# Patient Record
Sex: FEMALE | Race: BLACK OR AFRICAN AMERICAN | NOT HISPANIC OR LATINO | URBAN - METROPOLITAN AREA
[De-identification: names, ages, dates, MRNs, and addresses within clinical notes are randomized per-mention and may not be internally consistent; named-entity substitution may affect disease eponyms.]

---

## 2020-01-29 ENCOUNTER — EMERGENCY (EMERGENCY)
Facility: HOSPITAL | Age: 55
LOS: 1 days | Discharge: HOME | End: 2020-01-29
Admitting: EMERGENCY MEDICINE
Payer: COMMERCIAL

## 2020-01-29 VITALS
HEIGHT: 66 IN | OXYGEN SATURATION: 100 % | RESPIRATION RATE: 16 BRPM | SYSTOLIC BLOOD PRESSURE: 154 MMHG | TEMPERATURE: 98 F | WEIGHT: 240.08 LBS | DIASTOLIC BLOOD PRESSURE: 100 MMHG | HEART RATE: 63 BPM

## 2020-01-29 PROCEDURE — 99284 EMERGENCY DEPT VISIT MOD MDM: CPT

## 2020-01-29 PROCEDURE — 99283 EMERGENCY DEPT VISIT LOW MDM: CPT

## 2020-01-29 RX ORDER — OXYCODONE AND ACETAMINOPHEN 5; 325 MG/1; MG/1
1 TABLET ORAL ONCE
Refills: 0 | Status: DISCONTINUED | OUTPATIENT
Start: 2020-01-29 | End: 2020-01-29

## 2020-01-29 RX ORDER — LIDOCAINE 4 G/100G
1 CREAM TOPICAL ONCE
Refills: 0 | Status: COMPLETED | OUTPATIENT
Start: 2020-01-29 | End: 2020-01-29

## 2020-01-29 RX ADMIN — OXYCODONE AND ACETAMINOPHEN 1 TABLET(S): 5; 325 TABLET ORAL at 07:35

## 2020-01-29 RX ADMIN — LIDOCAINE 1 PATCH: 4 CREAM TOPICAL at 07:21

## 2020-01-29 RX ADMIN — OXYCODONE AND ACETAMINOPHEN 1 TABLET(S): 5; 325 TABLET ORAL at 07:20

## 2020-01-29 NOTE — ED PROVIDER NOTE - OBJECTIVE STATEMENT
S/w pt and advised Dr. Vaughn gave referral to [facility] and gave number to schedule appt. [phone].  Gave the names of the medications that the Gastroenterologist at [place] recommended also.      Pt states understanding.  States she is going to restart IvIg per rheumatology if she can afford the medication.    Neeta GIPSON RN  EP Triage     54 F pmh gastric bypass, chronic low back pain/sciatica p/w acute on chronic low back pain exacerbated in setting of sitting in chair in ED while waiting with her daughter who is waiting for psych admission.  Pt reports prior to coming to ED she took her percocet which is prescribed by pain management doctor at [facility] then took motrin around 4am but forgot rest of her meds at home.  States sitting in chair exacerbated her chronic L lower back pain w/ radiation down LLE- reports chronic tingling in LLE, no new numbness/weakness.  Pt ambulates with cane.  Denies fever, numbness, weakness, bowel/bladder dysfunction, dysuria, hematuria, saddle paresthesia, h/o CA, h/o IVDA, fall/trauma

## 2020-01-29 NOTE — ED ADULT NURSE NOTE - CHPI ED NUR SYMPTOMS NEG
no bladder dysfunction/no constipation/no anorexia/no motor function loss/no bowel dysfunction/no neck tenderness

## 2020-01-29 NOTE — ED ADULT TRIAGE NOTE - CHIEF COMPLAINT QUOTE
low back pain since 3 am (H/O sciatica- took Percocet 9pm and Motrin 4am) pain now worst - radiates to left leg

## 2020-01-29 NOTE — ED PROVIDER NOTE - PHYSICAL EXAMINATION
Vitals reviewed  Gen: appears in mild discomfort sitting on edge of stretcher but nad, speaking in full sentences  Skin: wwp   HEENT: ncat, eomi, mmm  Back: L lumbar and L gluteal ttp, no midline ttp, +L SLR  CV: rrr  Resp: unlabored breathing  Abd: obese, soft/nt  Ext: FROM throughout, no peripheral edema, distal sensation intact, 5/5 strength in all ext  Neuro: alert/oriented, no focal deficits, steady gait with cane

## 2020-01-29 NOTE — ED PROVIDER NOTE - CLINICAL SUMMARY MEDICAL DECISION MAKING FREE TEXT BOX
54 F pmh gastric bypass, chronic low back pain/sciatica p/w acute on chronic low back pain exacerbated in setting of sitting in chair in ED while waiting for family member in ED.  Given percocet/lidocaine patch which pt takes as outpt prescribed pain management regimen but left at home.  discussed strict return parameters

## 2020-01-29 NOTE — ED PROVIDER NOTE - NSFOLLOWUPINSTRUCTIONS_ED_ALL_ED_FT
Continue your prescribed pain management regimen and follow up with pain management doctor within one week.  Return to ED for fever, worsening pain, numbness, weakness, dizziness, falls, difficulty walking, bowel or bladder dysfunction    Sciatica     Sciatica is pain, numbness, weakness, or tingling along your sciatic nerve. The sciatic nerve starts in the lower back and goes down the back of each leg. Sciatica happens when this nerve is pinched or has pressure put on it. Sciatica usually goes away on its own or with treatment. Sometimes, sciatica may keep coming back (recur).  Follow these instructions at home:  Medicines     Take over-the-counter and prescription medicines only as told by your doctor.Do not drive or use heavy machinery while taking prescription pain medicine.Managing pain     If directed, put ice on the affected area.  Put ice in a plastic bag.Place a towel between your skin and the bag.Leave the ice on for 20 minutes, 2–3 times a day.After icing, apply heat to the affected area before you exercise or as often as told by your doctor. Use the heat source that your doctor tells you to use, such as a moist heat pack or a heating pad.  Place a towel between your skin and the heat source.Leave the heat on for 20–30 minutes.Remove the heat if your skin turns bright red. This is especially important if you are unable to feel pain, heat, or cold. You may have a greater risk of getting burned.Activity     Return to your normal activities as told by your doctor. Ask your doctor what activities are safe for you.  Avoid activities that make your sciatica worse.Take short rests during the day. Rest in a lying or standing position. This is usually better than sitting to rest.  When you rest for a long time, do some physical activity or stretching between periods of rest.Avoid sitting for a long time without moving. Get up and move around at least one time each hour.Exercise and stretch regularly, as told by your doctor.Do not lift anything that is heavier than 10 lb (4.5 kg) while you have symptoms of sciatica.  Avoid lifting heavy things even when you do not have symptoms.Avoid lifting heavy things over and over.When you lift objects, always lift in a way that is safe for your body. To do this, you should:  Bend your knees.Keep the object close to your body.Avoid twisting.General instructions     Use good posture.  Avoid leaning forward when you are sitting.Avoid hunching over when you are standing.Stay at a healthy weight.Wear comfortable shoes that support your feet. Avoid wearing high heels.Avoid sleeping on a mattress that is too soft or too hard. You might have less pain if you sleep on a mattress that is firm enough to support your back.Keep all follow-up visits as told by your doctor. This is important.Contact a doctor if:  You have pain that:  Wakes you up when you are sleeping.Gets worse when you lie down.Is worse than the pain you have had in the past.Lasts longer than 4 weeks.You lose weight for without trying.Get help right away if:  You cannot control when you pee (urinate) or poop (have a bowel movement).You have weakness in any of these areas and it gets worse.  Lower back.Lower belly (pelvis).Butt (buttocks).Legs.You have redness or swelling of your back.You have a burning feeling when you pee.This information is not intended to replace advice given to you by your health care provider. Make sure you discuss any questions you have with your health care provider.

## 2020-01-29 NOTE — ED PROVIDER NOTE - PATIENT PORTAL LINK FT
You can access the FollowMyHealth Patient Portal offered by [facility] by registering at the following website: [email or web address]. By joining [facility]’s FollowMyHealth portal, you will also be able to view your health information using other applications (apps) compatible with our system.

## 2020-01-29 NOTE — ED ADULT NURSE NOTE - OBJECTIVE STATEMENT
low back pain since 3 am (H/O sciatica- took Percocet 9pm and Motrin 4am) pain now worst - radiates to left leg  distal pms in-tact, pt. has been in ED all night with family member who is a pt.

## 2020-02-06 DIAGNOSIS — G89.29 OTHER CHRONIC PAIN: ICD-10-CM

## 2020-02-06 DIAGNOSIS — M54.5 LOW BACK PAIN: ICD-10-CM

## 2020-02-06 DIAGNOSIS — M54.42 LUMBAGO WITH SCIATICA, LEFT SIDE: ICD-10-CM
